# Patient Record
Sex: FEMALE | Race: BLACK OR AFRICAN AMERICAN | Employment: UNEMPLOYED | URBAN - METROPOLITAN AREA
[De-identification: names, ages, dates, MRNs, and addresses within clinical notes are randomized per-mention and may not be internally consistent; named-entity substitution may affect disease eponyms.]

---

## 2018-07-09 ENCOUNTER — HOSPITAL ENCOUNTER (OUTPATIENT)
Dept: LAB | Age: 53
Discharge: HOME | End: 2018-07-09

## 2018-07-09 PROCEDURE — 88305 TISSUE EXAM BY PATHOLOGIST: CPT | Performed by: INTERNAL MEDICINE

## 2018-12-17 ENCOUNTER — HOSPITAL ENCOUNTER (OUTPATIENT)
Dept: OCCUPATIONAL MEDICINE | Age: 53
Discharge: HOME | End: 2018-12-17

## 2018-12-17 DIAGNOSIS — T14.90XA INJURY: ICD-10-CM

## 2020-07-27 ENCOUNTER — HOSPITAL ENCOUNTER (OUTPATIENT)
Dept: LAB | Age: 55
Discharge: HOME | End: 2020-07-27

## 2020-07-27 PROCEDURE — 88305 TISSUE EXAM BY PATHOLOGIST: CPT

## 2023-02-09 ENCOUNTER — APPOINTMENT (RX ONLY)
Dept: URBAN - METROPOLITAN AREA CLINIC 329 | Facility: CLINIC | Age: 58
Setting detail: DERMATOLOGY
End: 2023-02-09

## 2023-02-09 DIAGNOSIS — M79.3 PANNICULITIS, UNSPECIFIED: ICD-10-CM | Status: INADEQUATELY CONTROLLED

## 2023-02-09 PROCEDURE — ? PRESCRIPTION

## 2023-02-09 PROCEDURE — ? RECOMMENDATIONS

## 2023-02-09 PROCEDURE — ? OTHER

## 2023-02-09 PROCEDURE — ? COUNSELING

## 2023-02-09 PROCEDURE — 99204 OFFICE O/P NEW MOD 45 MIN: CPT

## 2023-02-09 PROCEDURE — ? PRESCRIPTION MEDICATION MANAGEMENT

## 2023-02-09 RX ORDER — FLUTICASONE PROPIONATE 0.5 MG/G
CREAM TOPICAL
Qty: 60 | Refills: 2 | Status: ERX | COMMUNITY
Start: 2023-02-09

## 2023-02-09 RX ORDER — IBUPROFEN 600 MG/1
TABLET, FILM COATED ORAL
Qty: 90 | Refills: 0 | Status: ERX | COMMUNITY
Start: 2023-02-09

## 2023-02-09 RX ADMIN — IBUPROFEN: 600 TABLET, FILM COATED ORAL at 00:00

## 2023-02-09 RX ADMIN — FLUTICASONE PROPIONATE: 0.5 CREAM TOPICAL at 00:00

## 2023-02-09 ASSESSMENT — LOCATION SIMPLE DESCRIPTION DERM: LOCATION SIMPLE: RIGHT PRETIBIAL REGION

## 2023-02-09 ASSESSMENT — LOCATION ZONE DERM: LOCATION ZONE: LEG

## 2023-02-09 ASSESSMENT — LOCATION DETAILED DESCRIPTION DERM: LOCATION DETAILED: RIGHT PROXIMAL PRETIBIAL REGION

## 2023-02-09 NOTE — PROCEDURE: PRESCRIPTION MEDICATION MANAGEMENT
Detail Level: Zone
Render In Strict Bullet Format?: No
Initiate Treatment: Ibuprofen 600mg tid\\nFluticasone cream bid

## 2023-02-09 NOTE — PROCEDURE: RECOMMENDATIONS
Detail Level: Zone
Render Risk Assessment In Note?: no
Recommendation Preamble: The following recommendations were made during the visit:
Recommendations (Free Text): The swelling on the pretibial area is not below the skin, but rather below the muscle, or between muscle layers. Therefore, this might be best evaluated by an orthopedic position versus a dermatologist. I favor that this is lymphatic collection due to the trauma below the leg and that this will resolve on its own with time. 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 I cautioned her about the side effect of kidney failure with prolonged use of ibuprofen. This patient relates that she can continue and her job without problem, so therefore she is at her maximum medical improvement as far as dermatology is concerned. I do again recommend that she see an orthopedic doctor for the large swelling on her proximal pretibial area on the right side.

## 2023-02-09 NOTE — HPI: BODY LOCATION - LEG
How Severe Is Your Condition?: moderate
Additional History: Patient had an accident at work hitting a pole causing a painful lump under skin

## 2023-02-09 NOTE — PROCEDURE: OTHER
Other (Free Text): Patient states she had an accident at work hitting a pole causing a painful lump under skin
Detail Level: Zone
Render Risk Assessment In Note?: no
Note Text (......Xxx Chief Complaint.): This diagnosis correlates with the

## 2023-03-08 ENCOUNTER — APPOINTMENT (RX ONLY)
Dept: URBAN - METROPOLITAN AREA CLINIC 329 | Facility: CLINIC | Age: 58
Setting detail: DERMATOLOGY
End: 2023-03-08

## 2023-03-08 DIAGNOSIS — M79.3 PANNICULITIS, UNSPECIFIED: ICD-10-CM | Status: INADEQUATELY CONTROLLED

## 2023-03-08 PROCEDURE — ? COUNSELING

## 2023-03-08 PROCEDURE — ? OTHER

## 2023-03-08 PROCEDURE — 99212 OFFICE O/P EST SF 10 MIN: CPT

## 2023-03-08 ASSESSMENT — LOCATION DETAILED DESCRIPTION DERM
LOCATION DETAILED: RIGHT DISTAL PRETIBIAL REGION
LOCATION DETAILED: RIGHT PROXIMAL PRETIBIAL REGION

## 2023-03-08 ASSESSMENT — LOCATION SIMPLE DESCRIPTION DERM: LOCATION SIMPLE: RIGHT PRETIBIAL REGION

## 2023-03-08 ASSESSMENT — LOCATION ZONE DERM: LOCATION ZONE: LEG

## 2023-03-08 NOTE — PROCEDURE: OTHER
Note Text (......Xxx Chief Complaint.): This diagnosis correlates with the
Detail Level: Zone
Render Risk Assessment In Note?: no
Other (Free Text): Patient saw an orthopedic doctor several weeks ago. Ultra sound showed fluid and nerve damage. She has been referred to another orthopedic doctor for a second opinion.

## 2023-03-15 ENCOUNTER — CLINICAL DOCUMENTATION (OUTPATIENT)
Dept: ORTHOPEDIC SURGERY | Age: 58
End: 2023-03-15

## 2023-03-15 NOTE — PROGRESS NOTES
JOHN letter faxed to pt's , Don Estrada, who requested the JOHN from [place]. Letter faxed to #[phone] and a complete was received.

## 2023-03-16 ENCOUNTER — CLINICAL DOCUMENTATION (OUTPATIENT)
Dept: ORTHOPEDIC SURGERY | Age: 58
End: 2023-03-16

## 2023-03-16 NOTE — PROGRESS NOTES
THIS WAS A WC REFERRAL FROM [facility] AND [employer].  WANTED TO DO AS AN JOHN WITH OPTION TO TREAT. I VERIFIED WITH THE WC  STEPHON STINSON THAT THE JOHN WAS AGREEABLE WITH HER.DAVID SAID SHE WOULD MAKE THE APPT ONCE THE JOHN PAYMENT WAS RECEIVED. I AM GOING TO LET 'S TEAM HANDLE.

## 2023-05-10 ENCOUNTER — TELEPHONE (OUTPATIENT)
Dept: ORTHOPEDIC SURGERY | Age: 58
End: 2023-05-10

## 2023-05-10 NOTE — TELEPHONE ENCOUNTER
Returned the call from Dannielle Burden with [employer]. Scheduled pt's JOHN apt and told him I will confirm apt date once payment has been received.

## 2023-05-23 ENCOUNTER — TELEPHONE (OUTPATIENT)
Dept: ORTHOPEDIC SURGERY | Age: 58
End: 2023-05-23

## 2023-05-23 NOTE — TELEPHONE ENCOUNTER
Called Vanna Jaffe, [place] , and LM to let him know that we have not yet received payment for the pt's JOHN. If payment is not received today, we will need to cancel JOHN for tomorrow.

## 2023-05-23 NOTE — TELEPHONE ENCOUNTER
LM with [place], [place] , that I just looked at the mail from today and we have yet to receive the JOHN check. Bibiana Zamora that I will be cancelling pt's JOHN apt for tomorrow by the end of the day unless he calls back to let me know he is dropping off the check to the office.

## 2023-05-23 NOTE — TELEPHONE ENCOUNTER
[address], [place] , to let him know new time for pt's JOHN will be 11:40am not 11:30am on 6-12-23 as we are starting a new schedule that day.

## 2023-05-31 ENCOUNTER — TELEPHONE (OUTPATIENT)
Dept: ORTHOPEDIC SURGERY | Age: 58
End: 2023-05-31

## 2023-05-31 NOTE — TELEPHONE ENCOUNTER
Called pt to let her know JOHN payment was received so wanted to confirm apt. Pt requested JOHN date to be changed. Changed apt date.

## 2023-06-28 ENCOUNTER — TELEPHONE (OUTPATIENT)
Dept: ORTHOPEDIC SURGERY | Age: 58
End: 2023-06-28

## 2023-06-28 DIAGNOSIS — S89.91XA INJURY OF RIGHT LOWER EXTREMITY, INITIAL ENCOUNTER: Primary | ICD-10-CM

## 2023-06-29 ENCOUNTER — CLINICAL DOCUMENTATION (OUTPATIENT)
Dept: ORTHOPEDIC SURGERY | Age: 58
End: 2023-06-29

## 2023-07-14 ENCOUNTER — CLINICAL DOCUMENTATION (OUTPATIENT)
Dept: ORTHOPEDIC SURGERY | Age: 58
End: 2023-07-14

## 2023-07-14 NOTE — PROGRESS NOTES
Faxed MRI order to [facility] at fax #[phone] at the request of [employer]. Received a complete.

## 2023-07-17 ENCOUNTER — CLINICAL DOCUMENTATION (OUTPATIENT)
Dept: ORTHOPEDIC SURGERY | Age: 58
End: 2023-07-17

## 2023-07-17 NOTE — PROGRESS NOTES
Faxed MRI order to [facility], fax # [phone] at the fax request of [employer]. MRI scheduled 8-1-23, 9:15 AM [facility].

## 2023-07-19 ENCOUNTER — CLINICAL DOCUMENTATION (OUTPATIENT)
Dept: ORTHOPEDIC SURGERY | Age: 58
End: 2023-07-19

## 2023-07-19 ENCOUNTER — TELEPHONE (OUTPATIENT)
Dept: ORTHOPEDIC SURGERY | Age: 58
End: 2023-07-19

## 2023-07-20 DIAGNOSIS — S89.91XA INJURY OF RIGHT LOWER EXTREMITY, INITIAL ENCOUNTER: ICD-10-CM

## 2023-07-21 ENCOUNTER — TELEPHONE (OUTPATIENT)
Dept: ORTHOPEDIC SURGERY | Age: 58
End: 2023-07-21

## 2023-07-21 NOTE — TELEPHONE ENCOUNTER
Called pt to let her know I had one of MET's partner's ([address] [place] [address]) review her MRI results to make sure she could wait to see MET when he returns. They stated she could wait unless she begins to feel worse. Told pt if she begins to feel worse, please call and we will get her in with one of MET's partners. Pt stated she is having [facility] treat her for cellulitis.

## 2023-08-14 ENCOUNTER — CLINICAL DOCUMENTATION (OUTPATIENT)
Dept: ORTHOPEDIC SURGERY | Age: 58
End: 2023-08-14

## 2023-08-14 NOTE — PROGRESS NOTES
Faxed pt's date/time of MRI results apt. to [employer] with [employer] as she was unable to open the [place]. \" Received a complete.

## 2023-08-18 ENCOUNTER — OFFICE VISIT (OUTPATIENT)
Dept: ORTHOPEDIC SURGERY | Age: 58
End: 2023-08-18

## 2023-08-18 VITALS — HEIGHT: 64 IN | WEIGHT: 234 LBS | BODY MASS INDEX: 39.95 KG/M2

## 2023-08-18 DIAGNOSIS — S89.91XS INJURY OF RIGHT LOWER EXTREMITY, SEQUELA: Primary | ICD-10-CM

## 2023-08-18 NOTE — PROGRESS NOTES
Name: Betty Hawthorne  YOB: 1965  Gender: female  MRN: [id number]    08/18/2023: Returns to discuss MRI scan: Follow-up delayed due to my humerus fracture: July 2023, she reports treatment at [facility] for cellulitis    HPI:   05/07/2022: Right leg injury  06/15/2023: JOHN: Right leg injury    ROS/Meds/PSH/PMH/FH/SH: reviewed today    Tobacco:  has no history on file for tobacco use. Physical Examination:  Patient appears to be alert and oriented with acceptable appearance. No obvious distress or SOB  CV: appears to have acceptable vascular color and capillary refill  Neuro: Sensation appears intact except for anterior lateral calf to ankle diminished light touch sensation  Skin: Proximal 1/3 anterior lateral calf soft tissue thickening; hyperpigmentation; no redness; no open lesion; suspected fascial defect  MS: Standing: Plantigrade: Gait full  Right = proximal 1/3 suspected anterior lateral compartment fascial defect  Right = no knee, ankle, foot pain  Right = full knee/ankle/foot motion with no evidence of peroneal motor neuropathy; 5/5 strength; no instability or crepitance    XR: No x-rays taken today  XR Impression:  As above      Reviewed Test/Records/Documents:   08/27/2020: Dr. Hang Landry: Treated bilateral knee arthritis  05/08/2022: Right tibia/fibula x-ray: Radiology impression: No acute fracture or malalignment. No radiopaque soft tissue foreign body  05/17/2022: 05/31/2022: 06/18/2022: 12/07/2022: [facility] urgent care notes reviewed: Reflects initial treatment at : Diagnosed with right lower leg contusion:  12/19/2022: Right lower extremity soft tissue ultrasound: Radiology impression: Probable hematoma or seroma of the right anterior lower leg  02/09/2023: Dr. Pam Rod: Advanced dermatology note: Reflects leg injury. Diagnosed with panniculitis with lymphatic collection due to trauma.   Recommended orthopedist  02/23/2023: Teresa Treviño note: Diagnosed hematoma and contusion right

## 2024-01-30 ENCOUNTER — HOSPITAL ENCOUNTER (EMERGENCY)
Age: 59
Discharge: HOME | End: 2024-01-30
Payer: COMMERCIAL

## 2024-01-30 VITALS
BODY MASS INDEX: 39.27 KG/M2 | RESPIRATION RATE: 18 BRPM | OXYGEN SATURATION: 98 % | DIASTOLIC BLOOD PRESSURE: 81 MMHG | WEIGHT: 230 LBS | HEIGHT: 64 IN | HEART RATE: 66 BPM | SYSTOLIC BLOOD PRESSURE: 147 MMHG | TEMPERATURE: 98.1 F

## 2024-01-30 DIAGNOSIS — M79.604 RIGHT LEG PAIN: Primary | ICD-10-CM

## 2024-01-30 LAB
ALBUMIN SERPL-MCNC: 3.6 G/DL (ref 3.5–5)
ALBUMIN/GLOB SERPL: 0.9 (ref 0.4–1.6)
ALP SERPL-CCNC: 127 U/L (ref 50–130)
ALT SERPL-CCNC: 22 U/L (ref 12–65)
ANION GAP SERPL CALC-SCNC: 2 MMOL/L (ref 2–11)
AST SERPL-CCNC: 11 U/L (ref 15–37)
BASOPHILS # BLD: 0 K/UL (ref 0–0.2)
BASOPHILS NFR BLD: 1 % (ref 0–2)
BILIRUB SERPL-MCNC: 0.3 MG/DL (ref 0.2–1.1)
BUN SERPL-MCNC: 13 MG/DL (ref 6–23)
CALCIUM SERPL-MCNC: 9.4 MG/DL (ref 8.3–10.4)
CHLORIDE SERPL-SCNC: 108 MMOL/L (ref 103–113)
CO2 SERPL-SCNC: 31 MMOL/L (ref 21–32)
CREAT SERPL-MCNC: 0.9 MG/DL (ref 0.6–1)
DIFFERENTIAL METHOD BLD: ABNORMAL
EOSINOPHIL # BLD: 0.1 K/UL (ref 0–0.8)
EOSINOPHIL NFR BLD: 2 % (ref 0.5–7.8)
ERYTHROCYTE [DISTWIDTH] IN BLOOD BY AUTOMATED COUNT: 14.8 % (ref 11.9–14.6)
GLOBULIN SER CALC-MCNC: 4.2 G/DL (ref 2.8–4.5)
GLUCOSE SERPL-MCNC: 90 MG/DL (ref 65–100)
HCT VFR BLD AUTO: 38.9 % (ref 35.8–46.3)
HGB BLD-MCNC: 12 G/DL (ref 11.7–15.4)
IMM GRANULOCYTES # BLD AUTO: 0 K/UL (ref 0–0.5)
IMM GRANULOCYTES NFR BLD AUTO: 0 % (ref 0–5)
LYMPHOCYTES # BLD: 2.3 K/UL (ref 0.5–4.6)
LYMPHOCYTES NFR BLD: 36 % (ref 13–44)
MCH RBC QN AUTO: 25.9 PG (ref 26.1–32.9)
MCHC RBC AUTO-ENTMCNC: 30.8 G/DL (ref 31.4–35)
MCV RBC AUTO: 84 FL (ref 82–102)
MONOCYTES # BLD: 0.6 K/UL (ref 0.1–1.3)
MONOCYTES NFR BLD: 10 % (ref 4–12)
NEUTS SEG # BLD: 3.4 K/UL (ref 1.7–8.2)
NEUTS SEG NFR BLD: 52 % (ref 43–78)
NRBC # BLD: 0 K/UL (ref 0–0.2)
PLATELET # BLD AUTO: 376 K/UL (ref 150–450)
PMV BLD AUTO: 10.2 FL (ref 9.4–12.3)
POTASSIUM SERPL-SCNC: 3.6 MMOL/L (ref 3.5–5.1)
PROCALCITONIN SERPL-MCNC: 0.06 NG/ML (ref 0–0.49)
PROT SERPL-MCNC: 7.8 G/DL (ref 6.3–8.2)
RBC # BLD AUTO: 4.63 M/UL (ref 4.05–5.2)
SODIUM SERPL-SCNC: 141 MMOL/L (ref 136–146)
WBC # BLD AUTO: 6.5 K/UL (ref 4.3–11.1)

## 2024-01-30 PROCEDURE — 85025 COMPLETE CBC W/AUTO DIFF WBC: CPT

## 2024-01-30 PROCEDURE — 99283 EMERGENCY DEPT VISIT LOW MDM: CPT

## 2024-01-30 PROCEDURE — 84145 PROCALCITONIN (PCT): CPT

## 2024-01-30 PROCEDURE — 80053 COMPREHEN METABOLIC PANEL: CPT

## 2024-01-30 RX ORDER — CLINDAMYCIN HYDROCHLORIDE 150 MG/1
300 CAPSULE ORAL 3 TIMES DAILY
Qty: 42 CAPSULE | Refills: 0 | Status: SHIPPED | OUTPATIENT
Start: 2024-01-30 | End: 2024-02-06

## 2024-01-30 ASSESSMENT — PAIN - FUNCTIONAL ASSESSMENT: PAIN_FUNCTIONAL_ASSESSMENT: 0-10

## 2024-01-30 ASSESSMENT — LIFESTYLE VARIABLES
HOW OFTEN DO YOU HAVE A DRINK CONTAINING ALCOHOL: NEVER
HOW MANY STANDARD DRINKS CONTAINING ALCOHOL DO YOU HAVE ON A TYPICAL DAY: PATIENT DOES NOT DRINK

## 2024-01-30 ASSESSMENT — PAIN DESCRIPTION - ORIENTATION: ORIENTATION: RIGHT

## 2024-01-30 ASSESSMENT — PAIN SCALES - GENERAL: PAINLEVEL_OUTOF10: 7

## 2024-01-30 ASSESSMENT — PAIN DESCRIPTION - LOCATION: LOCATION: LEG

## 2024-01-31 NOTE — ED PROVIDER NOTES
CBC with Auto Differential   Result Value Ref Range    WBC 6.5 4.3 - 11.1 K/uL    RBC 4.63 4.05 - 5.2 M/uL    Hemoglobin 12.0 11.7 - 15.4 g/dL    Hematocrit 38.9 35.8 - 46.3 %    MCV 84.0 82.0 - 102.0 FL    MCH 25.9 (L) 26.1 - 32.9 PG    MCHC 30.8 (L) 31.4 - 35.0 g/dL    RDW 14.8 (H) 11.9 - 14.6 %    Platelets 376 150 - 450 K/uL    MPV 10.2 9.4 - 12.3 FL    nRBC 0.00 0.0 - 0.2 K/uL    Differential Type AUTOMATED      Neutrophils % 52 43 - 78 %    Lymphocytes % 36 13 - 44 %    Monocytes % 10 4.0 - 12.0 %    Eosinophils % 2 0.5 - 7.8 %    Basophils % 1 0.0 - 2.0 %    Immature Granulocytes 0 0.0 - 5.0 %    Neutrophils Absolute 3.4 1.7 - 8.2 K/UL    Lymphocytes Absolute 2.3 0.5 - 4.6 K/UL    Monocytes Absolute 0.6 0.1 - 1.3 K/UL    Eosinophils Absolute 0.1 0.0 - 0.8 K/UL    Basophils Absolute 0.0 0.0 - 0.2 K/UL    Absolute Immature Granulocyte 0.0 0.0 - 0.5 K/UL   Comprehensive Metabolic Panel   Result Value Ref Range    Sodium 141 136 - 146 mmol/L    Potassium 3.6 3.5 - 5.1 mmol/L    Chloride 108 103 - 113 mmol/L    CO2 31 21 - 32 mmol/L    Anion Gap 2 2 - 11 mmol/L    Glucose 90 65 - 100 mg/dL    BUN 13 6 - 23 MG/DL    Creatinine 0.90 0.6 - 1.0 MG/DL    Est, Glom Filt Rate >60 >60 ml/min/1.73m2    Calcium 9.4 8.3 - 10.4 MG/DL    Total Bilirubin 0.3 0.2 - 1.1 MG/DL    ALT 22 12 - 65 U/L    AST 11 (L) 15 - 37 U/L    Alk Phosphatase 127 50 - 130 U/L    Total Protein 7.8 6.3 - 8.2 g/dL    Albumin 3.6 3.5 - 5.0 g/dL    Globulin 4.2 2.8 - 4.5 g/dL    Albumin/Globulin Ratio 0.9 0.4 - 1.6     Procalcitonin   Result Value Ref Range    Procalcitonin 0.06 0.00 - 0.49 ng/mL        No orders to display         Voice dictation software was used during the making of this note.  This software is not perfect and grammatical and other typographical errors may be present.  This note has not been completely proofread for errors.     Reed Person PA  01/30/24 [id number]

## 2024-01-31 NOTE — ED NOTES
I have reviewed discharge instructions with the patient.  The patient verbalized understanding.    Patient left ED via Discharge Method: ambulatory to Home with self.    Opportunity for questions and clarification provided.       Patient given 1 scripts.         To continue your aftercare when you leave the hospital, you may receive an automated call from our care team to check in on how you are doing.  This is a free service and part of our promise to provide the best care and service to meet your aftercare needs.” If you have questions, or wish to unsubscribe from this service please call [phone].  Thank you for Choosing our [facility] Emergency Department.

## 2024-01-31 NOTE — DISCHARGE INSTRUCTIONS
As discussed, I am not entirely convinced that you have an infection occurring in your right lower extremity however we will go ahead and change her antibiotic.  I do want you to follow-up with the provider who is interested in performing MRI.    You also need to do some compression with an Ace bandage.  Mild warmth, 1520 minutes at a time multiple times daily.  Keep your leg elevated when not ambulating.

## 2024-01-31 NOTE — ED TRIAGE NOTES
Pt c/o cellulitis of R shin, swelling and redness noted. Pt seen a few days ago and was told if it did not get better to come here for antibiotics.